# Patient Record
Sex: MALE | Race: WHITE | NOT HISPANIC OR LATINO | URBAN - METROPOLITAN AREA
[De-identification: names, ages, dates, MRNs, and addresses within clinical notes are randomized per-mention and may not be internally consistent; named-entity substitution may affect disease eponyms.]

---
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
## 2025-02-24 ENCOUNTER — OFFICE (OUTPATIENT)
Dept: URBAN - METROPOLITAN AREA CLINIC 104 | Facility: CLINIC | Age: 20
Setting detail: OPHTHALMOLOGY
End: 2025-02-24
Payer: MEDICAID

## 2025-02-24 DIAGNOSIS — H53.10: ICD-10-CM

## 2025-02-24 DIAGNOSIS — H50.34: ICD-10-CM

## 2025-02-24 DIAGNOSIS — H55.01: ICD-10-CM

## 2025-02-24 PROCEDURE — 92004 COMPRE OPH EXAM NEW PT 1/>: CPT | Performed by: OPHTHALMOLOGY

## 2025-02-24 ASSESSMENT — REFRACTION_CURRENTRX
OS_OVR_VA: 20/
OS_SPHERE: +3.00
OD_CYLINDER: -2.00
OD_AXIS: 29
OD_OVR_VA: 20/
OS_AXIS: 169
OD_SPHERE: +1.75
OS_CYLINDER: -3.00

## 2025-02-24 ASSESSMENT — REFRACTION_AUTOREFRACTION
OD_AXIS: 37
OD_CYLINDER: -2.00
OD_SPHERE: +2.75
OS_SPHERE: UTP

## 2025-02-24 ASSESSMENT — KERATOMETRY
OD_AXISANGLE_DEGREES: 121
OD_K1POWER_DIOPTERS: 41.77
OS_K1POWER_DIOPTERS: UTP
OD_K2POWER_DIOPTERS: 43.66

## 2025-02-24 ASSESSMENT — VISUAL ACUITY
OS_BCVA: 20/50
OD_BCVA: 20/50

## 2025-02-24 ASSESSMENT — CONFRONTATIONAL VISUAL FIELD TEST (CVF)
OD_FINDINGS: FULL
OS_FINDINGS: FULL

## 2025-04-14 ENCOUNTER — OFFICE (OUTPATIENT)
Dept: URBAN - METROPOLITAN AREA CLINIC 104 | Facility: CLINIC | Age: 20
Setting detail: OPHTHALMOLOGY
End: 2025-04-14
Payer: MEDICAID

## 2025-04-14 DIAGNOSIS — H55.01: ICD-10-CM

## 2025-04-14 DIAGNOSIS — H50.34: ICD-10-CM

## 2025-04-14 PROCEDURE — 99213 OFFICE O/P EST LOW 20 MIN: CPT | Performed by: SPECIALIST

## 2025-04-14 ASSESSMENT — REFRACTION_MANIFEST
OD_SPHERE: +3.75
OS_CYLINDER: -3.25
OS_VA1: 20/50
OS_SPHERE: +3.00
OD_SPHERE: +2.50
OD_VA1: 20/50
OD_AXIS: 030
OS_CYLINDER: -3.25
OS_AXIS: 170
OD_CYLINDER: -2.00
OD_AXIS: 030
OS_VA1: 20/60
OS_AXIS: 170
OD_CYLINDER: -2.00
OD_VA1: 20/50+
OS_SPHERE: +4.25

## 2025-04-14 ASSESSMENT — REFRACTION_AUTOREFRACTION
OD_CYLINDER: -2.00
OS_SPHERE: +4.75
OS_AXIS: 168
OD_CYLINDER: -2.00
OS_CYLINDER: -3.25
OD_SPHERE: +3.25
OS_CYLINDER: -3.50
OS_SPHERE: +4.25
OS_AXIS: 170
OD_AXIS: 035
OD_AXIS: 023
OD_SPHERE: +4.00

## 2025-04-14 ASSESSMENT — REFRACTION_CURRENTRX
OS_SPHERE: +3.00
OS_OVR_VA: 20/
OD_AXIS: 029
OD_CYLINDER: -2.00
OS_CYLINDER: -3.00
OD_OVR_VA: 20/
OD_SPHERE: +1.75
OS_AXIS: 171

## 2025-04-14 ASSESSMENT — VISUAL ACUITY
OS_BCVA: 20/50
OD_BCVA: 20/60+2

## 2025-04-14 ASSESSMENT — CONFRONTATIONAL VISUAL FIELD TEST (CVF)
OD_FINDINGS: FULL
OS_FINDINGS: FULL

## 2025-08-21 ENCOUNTER — OFFICE (OUTPATIENT)
Dept: URBAN - METROPOLITAN AREA CLINIC 70 | Facility: CLINIC | Age: 20
Setting detail: OPHTHALMOLOGY
End: 2025-08-21
Payer: MEDICAID

## 2025-08-21 DIAGNOSIS — H55.01: ICD-10-CM

## 2025-08-21 DIAGNOSIS — H50.34: ICD-10-CM

## 2025-08-21 PROCEDURE — 99213 OFFICE O/P EST LOW 20 MIN: CPT | Performed by: OPHTHALMOLOGY

## 2025-08-21 ASSESSMENT — REFRACTION_MANIFEST
OD_SPHERE: +2.50
OD_VA1: 20/50+
OS_VA1: 20/60
OS_SPHERE: +3.00
OS_CYLINDER: -3.25
OS_SPHERE: +4.25
OD_SPHERE: +3.75
OS_AXIS: 170
OS_VA1: 20/50
OD_CYLINDER: -2.00
OS_AXIS: 170
OS_CYLINDER: -3.25
OD_AXIS: 030
OD_VA1: 20/50
OD_CYLINDER: -2.00
OD_AXIS: 030

## 2025-08-21 ASSESSMENT — KERATOMETRY
OD_K1POWER_DIOPTERS: 42.00
OD_AXISANGLE_DEGREES: 118
OD_K2POWER_DIOPTERS: 43.75
OS_K2POWER_DIOPTERS: 44.50
OS_AXISANGLE_DEGREES: 084
OS_K1POWER_DIOPTERS: 41.00

## 2025-08-21 ASSESSMENT — REFRACTION_CURRENTRX
OS_OVR_VA: 20/
OD_OVR_VA: 20/
OS_SPHERE: +3.00
OS_CYLINDER: -3.00
OS_AXIS: 171
OD_CYLINDER: -2.00
OD_SPHERE: +1.75
OD_AXIS: 029

## 2025-08-21 ASSESSMENT — VISUAL ACUITY
OD_BCVA: 20/40
OS_BCVA: 20/40

## 2025-08-21 ASSESSMENT — REFRACTION_AUTOREFRACTION
OS_AXIS: 172
OD_AXIS: 28+
OD_SPHERE: +4.00
OS_SPHERE: +4.75
OD_SPHERE: +2.75
OD_CYLINDER: -2.00
OS_CYLINDER: -3.25
OD_CYLINDER: -2.00
OS_SPHERE: +4.00
OS_CYLINDER: -4.00
OS_AXIS: 170
OD_AXIS: 035

## 2025-08-21 ASSESSMENT — CONFRONTATIONAL VISUAL FIELD TEST (CVF)
OD_FINDINGS: FULL
OS_FINDINGS: FULL